# Patient Record
Sex: FEMALE | Race: WHITE
[De-identification: names, ages, dates, MRNs, and addresses within clinical notes are randomized per-mention and may not be internally consistent; named-entity substitution may affect disease eponyms.]

---

## 2018-02-01 ENCOUNTER — HOSPITAL ENCOUNTER (EMERGENCY)
Dept: HOSPITAL 31 - C.ER | Age: 68
Discharge: LEFT BEFORE DISCHARGE | End: 2018-02-01
Payer: COMMERCIAL

## 2018-02-01 VITALS
HEART RATE: 59 BPM | OXYGEN SATURATION: 96 % | TEMPERATURE: 98.5 F | DIASTOLIC BLOOD PRESSURE: 82 MMHG | RESPIRATION RATE: 14 BRPM | SYSTOLIC BLOOD PRESSURE: 147 MMHG

## 2018-02-01 VITALS — BODY MASS INDEX: 28.3 KG/M2

## 2018-02-01 DIAGNOSIS — Z02.89: Primary | ICD-10-CM

## 2018-02-01 DIAGNOSIS — F11.10: ICD-10-CM

## 2018-02-03 ENCOUNTER — HOSPITAL ENCOUNTER (EMERGENCY)
Dept: HOSPITAL 31 - C.ER | Age: 68
Discharge: HOME | End: 2018-02-03
Payer: COMMERCIAL

## 2018-02-03 VITALS
SYSTOLIC BLOOD PRESSURE: 135 MMHG | HEART RATE: 59 BPM | TEMPERATURE: 98.3 F | DIASTOLIC BLOOD PRESSURE: 71 MMHG | OXYGEN SATURATION: 99 % | RESPIRATION RATE: 18 BRPM

## 2018-02-03 VITALS — BODY MASS INDEX: 28.3 KG/M2

## 2018-02-03 DIAGNOSIS — F19.10: Primary | ICD-10-CM

## 2018-02-03 NOTE — C.PDOC
History Of Present Illness


68 year old female presents to the emergency room requesting detox. Patient has 

history of substance abuse. She offers no physical complaints at this time. 

Denies having suicidal or homicidal ideations. 





PMD: None





Time Seen by Provider: 02/03/18 13:24


Chief Complaint (Nursing): Substance Abuse


History Per: Patient


History/Exam Limitations: no limitations





Past Medical History


Reviewed: Historical Data, Nursing Documentation, Vital Signs


Vital Signs: 


 Last Vital Signs











Temp  98.3 F   02/03/18 12:55


 


Pulse  59 L  02/03/18 12:55


 


Resp  18   02/03/18 12:55


 


BP  135/71   02/03/18 12:55


 


Pulse Ox  99   02/03/18 13:58














- Medical History


PMH: Anxiety, Bipolar Disorder, Diverticulitis, Gastritis, HTN


   Denies: Depression, Diabetes, Hepatitis, HIV, Chronic Kidney Disease, 

Seizures, Sexually Transmitted Disease


Surgical History: Cholecystectomy





- [facility] Procedures








INJECT/INFUSE ELECTROLYT (06/11/15)


INJECT/INFUSE NEC (06/11/15)








Family History: States: No Known Family Hx





- Social History


Hx Tobacco Use: Yes (3 sticks/day)


Hx Alcohol Use: No


Hx Substance Use: Yes





- Immunization History


Hx Tetanus Toxoid Vaccination: No


Hx Influenza Vaccination: Yes


Hx Pneumococcal Vaccination: No





Review Of Systems


Except As Marked, All Systems Reviewed And Found Negative.


Constitutional: Negative for: Fever, Chills


Psych: Negative for: Suicidal ideation (and homicidal)





Physical Exam





- Physical Exam


Appears: Well, Non-toxic, No Acute Distress


Skin: Normal Color, Warm, Dry


Head: Atraumatic, Normacephalic


Eye(s): bilateral: Normal Inspection


Oral Mucosa: Moist


Neck: Normal ROM


Chest: Symmetrical


Respiratory: No Accessory Muscle Use


Extremity: Normal ROM, No Deformity


Neurological/Psych: Oriented x3, Normal Speech





ED Course And Treatment


O2 Sat by Pulse Oximetry: 99 (RA)


Pulse Ox Interpretation: Normal





Medical Decision Making


Medical Decision Making: 





Time: 13:17


Plan:


No detox beds are available today.


Patient seen by crisis worker and provided with outpatient resources. 

Instructed to return to ER for any worsening symptoms. 





Disposition


Counseled Patient/Family Regarding: Diagnosis, Need For Followup





- Disposition


Referrals: 


Alcoholics Anonymous [Outside]


[facility] [Outside]


[facility] [Outside]


Disposition: HOME/ ROUTINE


Disposition Time: 14:01


Condition: STABLE


Additional Instructions: 


Follow up with outpatient services


Return to ED if any increase symptoms


Instructions:  Narcotic Abuse (ED)


Forms:  [facility] (English)





- POA


Present On Arrival: None





- Clinical Impression


Clinical Impression: 


 Substance abuse








- PA / NP / Resident Statement


MD/DO has reviewed & agrees with the documentation as recorded.





- Scribe Statement


The provider has reviewed the documentation as recorded by the Scribe (Lawanda Soni)





All medical record entries made by the Scribe were at my direction and 

personally dictated by me. I have reviewed the chart and agree that the record 

accurately reflects my personal performance of the history, physical exam, 

medical decision making, and the department course for this patient. I have 

also personally directed, reviewed, and agree with the discharge instructions 

and disposition.

## 2018-02-13 ENCOUNTER — HOSPITAL ENCOUNTER (INPATIENT)
Dept: HOSPITAL 31 - C.ER | Age: 68
LOS: 5 days | Discharge: HOME | DRG: 745 | End: 2018-02-18
Attending: PSYCHIATRIC UNIT | Admitting: PSYCHIATRIC UNIT
Payer: COMMERCIAL

## 2018-02-13 VITALS — BODY MASS INDEX: 28.3 KG/M2

## 2018-02-13 DIAGNOSIS — I34.1: ICD-10-CM

## 2018-02-13 DIAGNOSIS — F41.1: ICD-10-CM

## 2018-02-13 DIAGNOSIS — F41.0: ICD-10-CM

## 2018-02-13 DIAGNOSIS — I10: ICD-10-CM

## 2018-02-13 DIAGNOSIS — F91.9: ICD-10-CM

## 2018-02-13 DIAGNOSIS — F11.23: Primary | ICD-10-CM

## 2018-02-13 DIAGNOSIS — F17.210: ICD-10-CM

## 2018-02-13 DIAGNOSIS — Z90.710: ICD-10-CM

## 2018-02-13 DIAGNOSIS — F31.9: ICD-10-CM

## 2018-02-13 LAB
ALBUMIN SERPL-MCNC: 4.4 G/DL (ref 3.5–5)
ALBUMIN/GLOB SERPL: 0.9 {RATIO} (ref 1–2.1)
ALT SERPL-CCNC: 32 U/L (ref 9–52)
AST SERPL-CCNC: 28 U/L (ref 14–36)
BACTERIA #/AREA URNS HPF: (no result) /[HPF]
BASOPHILS # BLD AUTO: 0.1 K/UL (ref 0–0.2)
BASOPHILS NFR BLD: 0.7 % (ref 0–2)
BILIRUB UR-MCNC: (no result) MG/DL
BUN SERPL-MCNC: 16 MG/DL (ref 7–17)
CALCIUM SERPL-MCNC: 10 MG/DL (ref 8.6–10.4)
CAOX CRY #/AREA URNS HPF: (no result) /HPF
EOSINOPHIL # BLD AUTO: 0.2 K/UL (ref 0–0.7)
EOSINOPHIL NFR BLD: 2.4 % (ref 0–4)
ERYTHROCYTE [DISTWIDTH] IN BLOOD BY AUTOMATED COUNT: 13.8 % (ref 11.5–14.5)
GFR NON-AFRICAN AMERICAN: 50
GLUCOSE UR STRIP-MCNC: NORMAL MG/DL
HGB BLD-MCNC: 15.6 G/DL (ref 11–16)
HYALINE CASTS #/AREA URNS LPF: (no result) /LPF (ref 0–2)
LEUKOCYTE ESTERASE UR-ACNC: (no result) LEU/UL
LYMPHOCYTES # BLD AUTO: 2.1 K/UL (ref 1–4.3)
LYMPHOCYTES NFR BLD AUTO: 23.8 % (ref 20–40)
MCH RBC QN AUTO: 31 PG (ref 27–31)
MCHC RBC AUTO-ENTMCNC: 33.6 G/DL (ref 33–37)
MCV RBC AUTO: 92.2 FL (ref 81–99)
MONOCYTES # BLD: 0.5 K/UL (ref 0–0.8)
MONOCYTES NFR BLD: 5.2 % (ref 0–10)
NEUTROPHILS # BLD: 5.9 K/UL (ref 1.8–7)
NEUTROPHILS NFR BLD AUTO: 67.9 % (ref 50–75)
NRBC BLD AUTO-RTO: 0.1 % (ref 0–2)
PH UR STRIP: 5 [PH] (ref 5–8)
PLATELET # BLD: 262 K/UL (ref 130–400)
PMV BLD AUTO: 8.8 FL (ref 7.2–11.7)
PROT UR STRIP-MCNC: (no result) MG/DL
RBC # BLD AUTO: 5.05 MIL/UL (ref 3.8–5.2)
RBC # UR STRIP: NEGATIVE /UL
SP GR UR STRIP: 1.03 (ref 1–1.03)
SQUAMOUS EPITHIAL: 5 /HPF (ref 0–5)
URINE NITRATE: NEGATIVE
UROBILINOGEN UR-MCNC: 2 MG/DL (ref 0.2–1)
WBC # BLD AUTO: 8.8 K/UL (ref 4.8–10.8)

## 2018-02-13 PROCEDURE — HZ46ZZZ GROUP COUNSELING FOR SUBSTANCE ABUSE TREATMENT, PSYCHOEDUCATION: ICD-10-PCS | Performed by: PSYCHIATRIC UNIT

## 2018-02-13 PROCEDURE — HZ2ZZZZ DETOXIFICATION SERVICES FOR SUBSTANCE ABUSE TREATMENT: ICD-10-PCS | Performed by: PSYCHIATRIC UNIT

## 2018-02-13 PROCEDURE — HZ59ZZZ INDIVIDUAL PSYCHOTHERAPY FOR SUBSTANCE ABUSE TREATMENT, SUPPORTIVE: ICD-10-PCS | Performed by: PSYCHIATRIC UNIT

## 2018-02-13 PROCEDURE — GZ3ZZZZ MEDICATION MANAGEMENT: ICD-10-PCS | Performed by: PSYCHIATRIC UNIT

## 2018-02-13 NOTE — C.PDOC
Addendum entered and electronically signed by Miryam Lemons DO  02/13/18 18:57

: 








- PA / NP / Resident Statement


MD/ has examined the patient and agrees with the treatment plan.





Original Note:








History Of Present Illness





<Alesha Avila - Last Filed: 02/13/18 18:23>





<Miryam Lemons - Last Filed: 02/13/18 18:56>





CC: "I'm here for detox"





HPI: 68 year old female with past medical history of heroin abuse, muscular 

atrophy, HTN, sigmoid diverticulosis, ANGE, depression and panic attacks 

presents to the ED for detox.  Patient states she has tried to stop heroin 

before on her own and at a hospital but the withdrawal symptoms were very 

extreme she could not.  She states she has never entered into a detox program.  

She denies nausea, vomiting, diarrhea, constipation, fever, headache, chest pain

, palpitations or shortness of breath.





PMD: Dr. Rowena Coburn


Medical History: muscular atrophy (patient uses a wheel chair for to walk long 

distances), HTN, sigmoid diverticulosis, ANGE, depression and panic attacks, 

gastritis, mitral valve prolapse 


Surgical history: umbilical hernia repair; c-sections x2; bladder lift x2; 

hysterectomy; left ovarian cyst removal; rectal prolapse repair; cholecystectomy

; bowel pacemaker 


Medications: Patient does not know complete list of medications: Meclizine 25mg 

po daily; Clozapine; Metroprolol tartrate 25mg po daily; sometimes takes 

Valsartan 160mg po 


Allergies: NKDA


Social History: lives with ; 10 bags of heroin IV per day for 20 years; 

denies alcohol; smoking for 17 years 4 cigarettes per day  (Alesha Avila)


History Per: Patient


History/Exam Limitations: no limitations


Modifying Factor(s): None





<Alesha Avila - Last Filed: 02/13/18 18:23>





<Miryam Lemons - Last Filed: 02/13/18 18:56>


Time Seen by Provider: 02/13/18 17:37


Chief Complaint (Nursing): Substance Abuse





Past Medical History





- Medical History


PMH: Anxiety, Bipolar Disorder, Depression, Diverticulitis, Gastritis, HTN


   Denies: Diabetes, Hepatitis, HIV, Chronic Kidney Disease, Seizures, Sexually 

Transmitted Disease


Surgical History: Cholecystectomy (partial)





- Social History


Hx Tobacco Use: Yes (3 sticks/day)


Hx Alcohol Use: No


Hx Substance Use: Yes





- Immunization History


Hx Tetanus Toxoid Vaccination: No


Hx Influenza Vaccination: Yes


Hx Pneumococcal Vaccination: No





<Alesha Avila - Last Filed: 02/13/18 18:23>





- Medical History


PMH: Anxiety, Bipolar Disorder, Depression, Diverticulitis, Gastritis, HTN


   Denies: Diabetes, Hepatitis, HIV, Chronic Kidney Disease, Seizures, Sexually 

Transmitted Disease


Surgical History: Cholecystectomy


Family History: States: No Known Family Hx





- Social History


Hx Tobacco Use: Yes


Hx Alcohol Use: No


Hx Substance Use: Yes





- Immunization History


Hx Tetanus Toxoid Vaccination: No


Hx Influenza Vaccination: Yes


Hx Pneumococcal Vaccination: No





<Miryam Lemons - Last Filed: 02/13/18 18:56>


Vital Signs: 





 Last Vital Signs











Temp  98.3 F   02/13/18 17:27


 


Pulse  85   02/13/18 17:27


 


Resp  18   02/13/18 17:27


 


BP  134/69   02/13/18 17:27


 


Pulse Ox  92 L  02/13/18 18:23














- [facility] Procedures











INJECT/INFUSE ELECTROLYT (06/11/15)


INJECT/INFUSE NEC (06/11/15)











Review Of Systems


Constitutional: Negative for: Fever, Chills, Sweats, Weakness


Cardiovascular: Negative for: Chest Pain, Palpitations, Edema


Respiratory: Negative for: Cough, Shortness of Breath


Gastrointestinal: Negative for: Nausea, Vomiting, Abdominal Pain, Diarrhea, 

Constipation


Genitourinary: Negative for: Dysuria


Psych: Positive for: Anxiety





<Alesha Avila - Last Filed: 02/13/18 18:23>





Physical Exam





- Physical Exam


Appears: Well


Skin: Normal Color


Head: Atraumatic


Eye(s): bilateral: Normal Inspection


Oral Mucosa: Moist


Cardiovascular: Rhythm Regular


Respiratory: Normal Breath Sounds, No Decreased Breath Sounds, No Accessory 

Muscle Use, No Rales, No Rhonchi, No Stridor, No Wheezing


Gastrointestinal/Abdominal: Normal Exam, Bowel Sounds, Soft, No Tenderness


Back: Other (pacemaker device on left lower back )


Extremity: No Tenderness, No Pedal Edema, No Calf Tenderness


Neurological/Psych: Oriented x3, Normal Speech, Normal Cognition, Normal 

Cranial Nerves





<Alesha Avila - Last Filed: 02/13/18 18:23>





ED Course And Treatment


O2 Sat by Pulse Oximetry: 92





<Alesha Avila - Last Filed: 02/13/18 18:23>





Medical Decision Making





<Alesha Avila - Last Filed: 02/13/18 18:23>





<Miryam Lemons - Last Filed: 02/13/18 18:56>


Medical Decision Making: 





Heroin Detox


- f/u alcohol serum


- f/u UDS 


- f/u CMP, CBC, UA (Alesha Avila)





Disposition





<Alesha Avila - Last Filed: 02/13/18 18:23>





- Disposition


Disposition Time: 19:00





<Miryam Lemons - Last Filed: 02/13/18 18:56>





- Disposition


Condition: STABLE


Forms:  [facility] (English)





- Clinical Impression


Clinical Impression: 


 Heroin dependence








Physician Patient Turnover


Patient Signed Over To: Sheri Myrick


Handoff Comments: pending blood work, UA, UDS results, crisis evaluation, 

disposition





<Miryam Lemons - Last Filed: 02/13/18 18:56>

## 2018-02-13 NOTE — PCM.BM
<Ania Cox - Last Filed: 02/13/18 21:08>





Treatment Plan Problems





- Problems identified on initial assessmt


  ** Potential for opiate withdrawal


Date Initiated: 02/13/18


Time Initiated: 21:08


Assessment reference: NA


Status: Active





Treatment assets and liabiliti


Patient Assests: negotiates basic needs, cognitively intact


Patient Liabilities: substance abuse (opiates), medical problems (Muscular 

atrophy, HTN,)





- Milieu Protocol


Maintain good personal hygiene: daily Encourage regular showers, daily Remind 

patient to perform daily oral care, daily Assist patient to perform ADL's


Conduct patient checks and document Observation sheet: Q15 minutes


Maintain personal safety: every shift Educate patient to report safety concerns 

to staff, every shift Monitor environment for contraband/sharps


Medication safety: Monitor for expected outcome, potential side effects: every 

shift, Assess barriers to learning: every shift, Assess readiness for 

medication education: every shift





<Muriel Arias - Last Filed: 02/15/18 00:38>





- Diagnosis


(1) Opioid use disorder, severe, dependence


Status: Acute   


Interventions: 





02/15/18 00:39


* Assess 7x/week regarding severity of withdrawal


* Educate regarding risks, benefits, side effects and alternatives of 

medications


* Use Motivational Interviewing for abstinence


* Use CBT for relapse prevention


* Medication management for withdrawal symptoms


* Encourage medication assisted treatment


* 








<Karena Falcon - Last Filed: 02/15/18 11:24>





Family Contact


Family contact: Patient agrees to contact, Telephone contact initiated by staff


Family contact name: Mateo


Family contacted how many times per week?: 3





- Goals for Treatment


Patient goals for treatment: Complete detox and attend IOP.





Discharge/Continuing Care





- Education Needs


Education Needs: Patient Medication, Patient Diagnosis/Disease Process, Patient 

Coping Skills, Patient Anger Management skills, Patient Placement options, 

Patient Community resources (spouse is in early recovery also.), Significant 

Other Medication, Significant Other Diagnosis/Disease Process, Significant 

Other Coping Skills, Significant Other Anger Management skills, Significant 

Other Placement options, Significant Other Community resources





- Discharge


Discharge Criteria: No longer exhibiting s/s of withdrawal, Reduction of target 

symptoms


Discharge to:: Home, With Family





- Treatment Team Participation


Patient/Family/SO Statement: 





02/15/18 11:24


"I wanna go to IOP not inpatient."


Discussed with Family/SO: Yes


Was Patient/Family/SO present at Treatment Team Meeting: Yes

## 2018-02-14 NOTE — PCM.PSYCH
Initial Psychiatric Evaluation





- Initial Psychiatric Evaluation


Type of Admission: Voluntary


Legal Status: Capacity


Chief Complaint (in patient's own words): 


" I want to detox."








History of Present Illness and Precipitating Events: 


The patient is a 68 year old  female who lives with her boyfriend of 35 

years. She has 4 adult children and is currently unemployed. The patient 

reports that she shoots 10 bags of heroin a day. She denies sharing needle and 

reports last using yesterday around 2pm.  She began using 25 years ago after 

she had a surgery and was in so much pain, my neighbor came over and gave me 

something and told me it would take the pain away but I didnt know what it was. 

The patient denies using other drugs and alcohol. She reports that she smokes 2-

3 cigarettes a day. The patient reports that he boyfriend is also a user but 

was here in detox last week. The patient reports that she has been to a 

methadone maintenance program 3-4 years ago for 2 months, her dose was 60mg. 

The pt reports yawning, eye tearing, chills, and belly cramping. 





Detox Hx: denies


Rehab Hx:denies


Medical Hx:HTH, muscular atrophy, diverticular disease 


Medications: metoprolol, Mirtazapine, clonazepam 


Psych Hx: depression, anxiety


Fam psych Hx: denies


Family substance abuse Hx: denies








Current Medications: 





Active Medications











Generic Name Dose Route Start Last Admin





  Trade Name Freq  PRN Reason Stop Dose Admin


 


Al Hydrox/Mg Hydrox/Simethicone  30 ml  02/14/18 10:24  





  Maalox 30 Ml  PO   





  TID PRN   





  Indigestion / Heartburn   


 


Clonazepam  0.5 mg  02/14/18 14:00  





  Klonopin  PO  02/15/18 10:01  





  TID DAVIS   


 


Clonazepam  0.5 mg  02/15/18 18:00  





  Klonopin  PO  02/16/18 10:01  





  BID DAVIS   


 


Clonidine HCl  0.1 mg  02/14/18 10:24  





  Catapres  PO   





  Q8 PRN   





  COWS Score More or Equal to 5   


 


Fluoxetine HCl  10 mg  02/15/18 10:00  





  Prozac  PO   





  DAILY DAVIS   


 


Gabapentin  100 mg  02/14/18 14:00  





  Neurontin  PO   





  TID DAVIS   


 


Gemfibrozil  600 mg  02/14/18 18:00  





  Lopid  PO   





  BID DAVIS   


 


Hydroxyzine HCl  25 mg  02/13/18 22:10  02/14/18 06:47





  Atarax  PO   25 mg





  Q6 PRN   Administration





  Anxiety   


 


Loperamide HCl  2 mg  02/14/18 10:24  





  Imodium  PO   





  Q8 PRN   





  Diarrhea   


 


Meclizine HCl  25 mg  02/14/18 18:00  





  Antivert  PO   





  BID DAVIS   


 


Mirtazapine  15 mg  02/14/18 22:00  





  Remeron  PO   





  HS DAVIS   


 


Ondansetron HCl  4 mg  02/14/18 10:24  





  Zofran Tab  PO   





  Q8 PRN   





  Nausea/Vomiting   


 


Trazodone HCl  50 mg  02/13/18 22:12  02/13/18 22:18





  Desyrel  PO   50 mg





  HS PRN   Administration





  insomnia   














Past Psychiatric History





- Past Psychiatric History


Pertinent Medical Hx (Current Medical&Sleep Prob, Allergies): 





 Allergies











Allergy/AdvReac Type Severity Reaction Status Date / Time


 


No Known Allergies Allergy   Verified 02/03/18 12:57








 





Unobtainable  02/13/18 











Review of Systems





- Constitutional


Constitutional: Chills, Weakness





- EENT


Eyes: Other (excessive tearing)





- Gastrointestinal


Gastrointestinal: Abdominal Pain, Cramping





- Neurological


Neurological: Weakness


Additional comments: 





yawning








- Psychiatric


Psychiatric: Anxiety, Depression





Mental Status Examination





- Personal Presentation


Personal Presentation: Looks stated age





- Affect


Affect: Broad





- Motor Activity


Motor Activity: Calm





- Reliability in Providing Information


Reliability in Providing Information: Good





- Speech


Speech: Organized





- Mood


Mood: Neutral





- Formal Thought Process


Formal Thought Process: No Impairment





- Obsessions/Compulsions


Obsessions: None


Compulsions: None





- Cognitive Functions


Orientation: Person, Place, Situation, Time


Sensorium: Alert


Attention/Concentration: Attentive


Abstract Thinking: [place]


Estimate of Intelligence: Average


Judgement: Intact, as evidence by: Good judgement, Intact, as evidence by: 

Insight regarding need for hospitalization


Memory: Recent intact, as evidence by: Ability to recall events of the day, 

Remote intact, as evidenced by: Abilit to recall sig. life events





- Risk


Risk: Withdrawal





- Strength & Assets Inventory


Strength & Assets Inventory: Cooperative





- Limitations


Additional comments: 


boyfriend is a user 








DSM 5 DX





- DSM 5


DSM 5 Diagnosis: 


Opioid Use Disorder - severe


Opioid Withdrawal 


Anxiety Disorder


Depression 








- Recommended/Plan of Treatment


Treatment Recommendations and Plan of Treatment: 





Opioid detox


Gabapentin for augmentation


As needed medications


All risks, benefits and alternatives of the meds discussed,


 and the pt agreed and understood. 


Attend groups and activities


Supportive therapy and psychoeducation


MI for abstinence


CBT for relapse prevention


Encourage MAT


Refer to rehab or IOP, and self-help groups





35 min





Projected ELOS: 4-5


Prognosis: good with treatment


Discharge Plan and Discharge Criteria: 





Outpatient Rehab and MAT

## 2018-02-15 NOTE — PCM.PYCHPN
Psychiatric Progress Note





- Psychiatric Progress Note


Patient seen today, length of contact: 15 min


Patient Chief Complaint: 


" I am anxious"


Problems Identified/Issues Discussed: 





The pt is seen, chart reviewed, case discussed with staff.


The pt is compliant with medications. Symptoms improving and patient needs more 

time to stabilize. After care discussed, support and psychoeducation given.


The patient reports that she feels anxious. She reports waking up in the middle 

of the night feeling anxious and reports withdrawal symptoms of: body aches, 

belly cramping, and decreased appetite. 





Medication Change: Yes


Medical Record Reviewed: Yes





Mental Status Examination





- Cognitive Function


Orientation: Person, Place, Situation, Time


Memory: Intact


Attention: WNL


Concentration: WNL


Association: WNL


Fund of Knowledge: [facility]


Decription of patient's judgement and insights: 





good








- Mood


Mood: Anxious





- Affect


Affect: Broad





- Speech


Speech: Appropriate





- Language


Language: Word Retrieval





- Formal Thought Process


Formal Thought Process: No Impairment





- Suicidal Ideation


Suicidal Ideation: No





- Homicidal Ideation


Homicidal Ideation: No





Goal/Treatment Plan





- Goal/Treatment Plan


Need for Continued Stay: Remain at risks for inpatient hospitalization, 

Discharge may exacerbated symptoms


Progress Toward Problem(s) and Goals/Treatment Plan: 





Opioid detox


Gabapentin for augmentation


As needed medications


All risks, benefits and alternatives of the meds discussed,


 and the pt agreed and understood. 


Attend groups and activities


Supportive therapy and psychoeducation


MI for abstinence


CBT for relapse prevention


Encourage MAT


Refer to rehab or IOP, and self-help groups











Estimated Date of D/C: 02/18/18

## 2018-02-16 VITALS — RESPIRATION RATE: 18 BRPM

## 2018-02-16 RX ADMIN — PANTOPRAZOLE SODIUM SCH MG: 20 TABLET, DELAYED RELEASE ORAL at 12:29

## 2018-02-16 NOTE — PCM.PYCHPN
Psychiatric Progress Note





- Psychiatric Progress Note


Patient seen today, length of contact: 15 min


Patient Chief Complaint: 


" I am having anxiety"


Problems Identified/Issues Discussed: 





The pt is seen, chart reviewed, case discussed with staff.


The pt is compliant with medications. Symptoms improving and patient needs more 

time to stabilize. After care discussed, support and psychoeducation given.


The patient reports that she is doing okay but she still feels anxious. She 

reports waking up at 3 am this morning due to anxiety. She says that she has a 

history of panic attacks. She also reports feeling of cramping after eating. 








Medication Change: Yes


Medical Record Reviewed: Yes





Mental Status Examination





- Cognitive Function


Orientation: Person, Place, Situation, Time


Memory: Intact


Attention: WNL


Concentration: WNL


Association: WNL


Fund of Knowledge: WNL


Decription of patient's judgement and insights: 





good








- Mood


Mood: Anxious





- Affect


Affect: Broad





- Speech


Speech: Appropriate





- Language


Language: Word Retrieval





- Formal Thought Process


Formal Thought Process: No Impairment





- Suicidal Ideation


Suicidal Ideation: No





- Homicidal Ideation


Homicidal Ideation: No





Goal/Treatment Plan





- Goal/Treatment Plan


Need for Continued Stay: Remain at risks for inpatient hospitalization, 

Discharge may exacerbated symptoms


Progress Toward Problem(s) and Goals/Treatment Plan: 





Opioid detox


Gabapentin for augmentation


As needed medications


All risks, benefits and alternatives of the meds discussed,


 and the pt agreed and understood. 


Attend groups and activities


Supportive therapy and psychoeducation


MI for abstinence


CBT for relapse prevention


Encourage MAT


Refer to rehab or IOP, and self-help groups











Estimated Date of D/C: 02/18/18





- Smoking Cessation


Smoking Cessation Initiated: No


Reason for not providing: patient does not smoke

## 2018-02-17 RX ADMIN — PANTOPRAZOLE SODIUM SCH MG: 20 TABLET, DELAYED RELEASE ORAL at 10:27

## 2018-02-17 NOTE — PCM.PYCHPN
Psychiatric Progress Note





- Psychiatric Progress Note


Patient seen today, length of contact: 15 min


Patient Chief Complaint: 





"My withdrawal symptoms are getting better with the medication"


Problems Identified/Issues Discussed: 





The pt is seen, chart reviewed, case discussed with staff. The pt stated that 

her withdrawal symptoms are getting better after taking medication. The pt is 

compliant with medications and reports no side-effects. Symptoms are improving 

but needs more time to stabilize.


After care discussed, support and psychoeducation given


Medication Change: Yes


Medical Record Reviewed: Yes





Mental Status Examination





- Cognitive Function


Orientation: Person, Place, Situation, Time


Memory: Intact


Attention: WNL


Concentration: WNL


Association: WN


Fund of Knowledge: [facility]


Decription of patient's judgement and insights: 





good/good


She is calm and cooperative





- Mood


Mood: Anxious





- Affect


Affect: Broad





- Speech


Speech: Appropriate





- Language


Language: Word Retrieval





- Formal Thought Process


Formal Thought Process: No Impairment


Psychotic Thoughts and Behaviors: 





denied





- Suicidal Ideation


Suicidal Ideation: No


Plan: 





denied





- Homicidal Ideation


Homicidal Ideation: No


Plan: 





denied





Goal/Treatment Plan





- Goal/Treatment Plan


Need for Continued Stay: Remain at risks for inpatient hospitalization, 

Discharge may exacerbated symptoms


Progress Toward Problem(s) and Goals/Treatment Plan: 





continue current treatment.


Therapy in milieu.





Estimated Date of D/C: 02/18/18





- Smoking Cessation


Smoking Cessation Initiated: Yes

## 2018-02-18 VITALS — OXYGEN SATURATION: 97 %

## 2018-02-18 VITALS — SYSTOLIC BLOOD PRESSURE: 154 MMHG | DIASTOLIC BLOOD PRESSURE: 90 MMHG | HEART RATE: 90 BPM | TEMPERATURE: 98.1 F

## 2018-02-18 RX ADMIN — PANTOPRAZOLE SODIUM SCH MG: 20 TABLET, DELAYED RELEASE ORAL at 09:06

## 2018-02-18 NOTE — PCM.PYCHDC
Mental Status Examination





- Mental Status Examination


Orientation: Person, Place, Situation, Time


Memory: Intact


Mood: Neutral


Affect: Broad


Speech: Appropriate


Attention: WNL


Concentration: WNL


Association: WNL


Fund of Knowledge: WNL


Formal Thought Process: No Impairment


Description of patient's judgement and insight: 





good/good


She is calm and cooperative and smiling appropriately


Psychotic Thoughts and Behaviors: 





denied


Suicidal Ideation: No


Current Homicidal Ideation?: No


Plan: 





denied SI, HI, intent or plan





Discharge Summary





- Discharge Note


Reason for Hospitalization: 





The patient is a 68 year old  female who lives with her boyfriend of 35 

years. She has 4 adult children and is currently unemployed. The patient 

reports that she shoots 10 bags of heroin a day. She denies sharing needle and 

reports last using yesterday around 2pm.  She began using 25 years ago after 

she had a surgery and was in so much pain, my neighbor came over and gave me 

something and told me it would take the pain away but I didnt know what it was. 

The patient denies using other drugs and alcohol. She reports that she smokes 2-

3 cigarettes a day. The patient reports that he boyfriend is also a user but 

was here in detox last week. The patient reports that she has been to a 

methadone maintenance program 3-4 years ago for 2 months, her dose was 60mg. 

The pt reports yawning, eye tearing, chills, and belly cramping. 





Detox Hx: denies


Rehab Hx:denies


Medical Hx:HTH, muscular atrophy, diverticular disease 


Medications: metoprolol, Mirtazapine, clonazepam 


Psych Hx: depression, anxiety


Fam psych Hx: denies


Family substance abuse Hx: denies


Consultations:: List each consultation separately and include:  1. Reason for 

request.  2. Findings.  3. Follow-up


Summary of Hospital Course include:: 1. Description of specific treatment plan 

utilized for patients during their course of treatmen.  2. Summarize the time-

course for resolution of acute symptoms and/or regressed behaviors.  3. 

Describe issues identified and worked on during hospitalization.  4. Describe 

medication utilized.  5. Describe medical problems identified and treated.  6. 

Reassessment of suicide risk


Summary of Hospital Course: 





The pt was admitted and started on detox treatment with psychotherapy, support, 

psychoeducation and medications. MI and CBT techniques were used. The pt was 

compliant with the treatment. The pt attended groups and activities, as well as 

milieu therapy. All the risks and benefits of medications were discussed and 

the patient understood and agreed. The pt improved with the treatment. Pt 

appreciate the treatment and care which was provided to him. At the time of d/c 

pt denied any depresive symptoms, manic symptoms. He denied any SI, HI, intent 

or plan. Pt denied any perceptual disturbances. He had behavioral issues. 

Psychoeducation was provided to the pt to be compliant with the medication, 

treatment plan and f/u plan after the discharge.


After care discussed with the patient.





- Diagnosis


(1) Opioid withdrawal


Status: Resolved   





(2) Opioid use disorder, severe, dependence


Status: Resolved   





- Final Diagnosis (DSM 5)


Condition upon Discharge: STABLE


Disposition: HOME/ ROUTINE


Follow-up Treatment Plan: 


Continue below medications after discharge.


Follow after care plan as discussed.


Use relapse prevention copying skills


Return to ER or call 911 if suicidal, homicidal or symptoms relapse.


Stay away from stress, alcohol and drugs.


See primary doctor once a year.


Time spend 28 minutes


Prescriptions/Medication Reconciliation: 


Escitalopram [Lexapro] 5 mg PO DAILY 30 Days #30 tab


Gabapentin [Neurontin] 300 mg PO BID 60 Days #60 cap


Meclizine [Meclizine*] 25 mg PO BID #30 tab


Mirtazapine [Remeron] 30 mg PO HS 30 Days #30 tab


traZODone [Desyrel] 50 mg PO HS PRN 30 Days #15 tab


 PRN Reason: insomnia





- Smoking Cessation


Smoking Cessation Medication prescribed: Yes





- Antipsychotic Medications


Pt discharged on 2 or more routine antipsychotic medications: No

## 2018-03-25 ENCOUNTER — HOSPITAL ENCOUNTER (EMERGENCY)
Dept: HOSPITAL 31 - C.ER | Age: 68
Discharge: HOME | End: 2018-03-25
Payer: MEDICAID

## 2018-03-25 VITALS — BODY MASS INDEX: 28.3 KG/M2

## 2018-03-25 VITALS
HEART RATE: 59 BPM | DIASTOLIC BLOOD PRESSURE: 54 MMHG | RESPIRATION RATE: 17 BRPM | TEMPERATURE: 98.1 F | SYSTOLIC BLOOD PRESSURE: 111 MMHG | OXYGEN SATURATION: 96 %

## 2018-03-25 DIAGNOSIS — I10: ICD-10-CM

## 2018-03-25 DIAGNOSIS — F19.10: Primary | ICD-10-CM

## 2018-03-25 DIAGNOSIS — F17.210: ICD-10-CM

## 2018-03-25 LAB
ALBUMIN SERPL-MCNC: 4.2 G/DL (ref 3.5–5)
ALBUMIN/GLOB SERPL: 1 {RATIO} (ref 1–2.1)
ALT SERPL-CCNC: 14 U/L (ref 9–52)
AST SERPL-CCNC: 20 U/L (ref 14–36)
BASOPHILS # BLD AUTO: 0 K/UL (ref 0–0.2)
BASOPHILS NFR BLD: 0.7 % (ref 0–2)
BUN SERPL-MCNC: 14 MG/DL (ref 7–17)
CALCIUM SERPL-MCNC: 9.3 MG/DL (ref 8.6–10.4)
EOSINOPHIL # BLD AUTO: 0.2 K/UL (ref 0–0.7)
EOSINOPHIL NFR BLD: 2.5 % (ref 0–4)
ERYTHROCYTE [DISTWIDTH] IN BLOOD BY AUTOMATED COUNT: 14 % (ref 11.5–14.5)
GFR NON-AFRICAN AMERICAN: 55
HGB BLD-MCNC: 14.4 G/DL (ref 11–16)
LYMPHOCYTES # BLD AUTO: 1.9 K/UL (ref 1–4.3)
LYMPHOCYTES NFR BLD AUTO: 29.3 % (ref 20–40)
MCH RBC QN AUTO: 31.3 PG (ref 27–31)
MCHC RBC AUTO-ENTMCNC: 34.1 G/DL (ref 33–37)
MCV RBC AUTO: 91.7 FL (ref 81–99)
MONOCYTES # BLD: 0.4 K/UL (ref 0–0.8)
MONOCYTES NFR BLD: 6.7 % (ref 0–10)
NEUTROPHILS # BLD: 4 K/UL (ref 1.8–7)
NEUTROPHILS NFR BLD AUTO: 60.8 % (ref 50–75)
NRBC BLD AUTO-RTO: 0.1 % (ref 0–2)
PLATELET # BLD: 227 K/UL (ref 130–400)
PMV BLD AUTO: 9 FL (ref 7.2–11.7)
RBC # BLD AUTO: 4.62 MIL/UL (ref 3.8–5.2)
WBC # BLD AUTO: 6.6 K/UL (ref 4.8–10.8)

## 2018-03-25 NOTE — CT
EXAM:

  CT Head Without Intravenous Contrast



CLINICAL HISTORY:

  68 years old, female; Pain; Headache



TECHNIQUE:

  Axial computed tomography images of the head/brain without intravenous 

contrast.  All CT scans at this facility use one or more dose reduction 

techniques, viz.: automated exposure control; ma/kV adjustment per patient size 

(including targeted exams where dose is matched to indication; i.e. head); or 

iterative reconstruction technique.

  Coronal and sagittal reformatted images were created and reviewed.



COMPARISON:

  No relevant prior studies available.



FINDINGS:

  Brain:  Extensive bilateral white matter hypoattenuation, nonspecific but may 

represent a chronic small vessel ischemic change, which would be advanced for 

age. Correlate with history. Vascular calcification. No hemorrhage.  No edema.

  Ventricles:  No hydrocephalus.

  Bones:  Skull is intact.

  Sinuses:  No acute sinusitis.

  Mastoid air cells:  No mastoid effusion.



IMPRESSION:     

  Extensive bilateral white matter hypoattenuation, nonspecific but may 

represent a chronic small vessel ischemic change, which would be advanced for 

age. Correlate with history. Correlate clinically.  Followup as warranted.

## 2018-03-25 NOTE — C.PDOC
History Of Present Illness


69 y/o female presents to the ED complaining of a headache with associated 

dizziness, onset 2 hours go. Family member also reports patient was confused an 

hour before, but this is now resolved. Denies any fever, chills, visual changes

, shortness of breath, nausea, vomiting, neck pain or stiffness. On further 

discussion patient reports Hx of headaches for years now, on and off.





PMD: Rowena Fowler 


Time Seen by Provider: 18 21:10


Chief Complaint (Nursing): Dizziness/Lightheaded


History Per: Patient


History/Exam Limitations: no limitations


Onset/Duration Of Symptoms: Hrs


Current Symptoms Are (Timing): Still Present





Past Medical History


Reviewed: Historical Data, Nursing Documentation, Vital Signs


Vital Signs: 


 Last Vital Signs











Temp  97.6 F   18 21:54


 


Pulse  65   18 21:54


 


Resp  16   18 21:54


 


BP  119/61   18 21:54


 


Pulse Ox  94 L  18 21:54














- Medical History


PMH: Anxiety, Bipolar Disorder, Depression, Diverticulitis, Gastritis, HTN


   Denies: Diabetes, Hepatitis, HIV, Chronic Kidney Disease, Seizures, Sexually 

Transmitted Disease


Surgical History: Cholecystectomy (partial)





- [facility] Procedures








DETOXIFICATION SERVICES FOR SUBSTANCE ABUSE TREATMENT (18)


GROUP  FOR SUBSTANCE ABUSE TREATMENT, PSYCHOEDUCATION (18)


INDIV PSYCHOTHERAPY FOR SUBSTANCE ABUSE TREATMENT, SUPPORT (18)


INJECT/INFUSE ELECTROLYT (06/11/15)


INJECT/INFUSE NEC (06/11/15)


MEDICATION MANAGEMENT (18)











- Social History


Hx Tobacco Use: Yes (3 sticks/day)


Hx Alcohol Use: No


Hx Substance Use: Yes





- Immunization History


Hx Tetanus Toxoid Vaccination: No


Hx Influenza Vaccination: Yes


Hx Pneumococcal Vaccination: No





Review Of Systems


Except As Marked, All Systems Reviewed And Found Negative.


Constitutional: Negative for: Fever, Chills


Eyes: Negative for: Vision Change


Respiratory: Negative for: Shortness of Breath


Gastrointestinal: Negative for: Nausea, Vomiting


Musculoskeletal: Negative for: Neck Pain


Neurological: Positive for: Confusion (now resolved), Headache, Dizziness.  

Negative for: Weakness, Numbness, Incoordination, Change in Speech





Physical Exam





- Physical Exam


Appears: Non-toxic, No Acute Distress, Other (appears alert, conscious)


Skin: Normal Color, Warm, Dry


Head: Atraumatic, Normacephalic


Eye(s): bilateral: Normal Inspection, PERRL, EOMI


Nose: Normal


Oral Mucosa: Moist


Neck: Normal ROM, No Midline Cervical Tenderness, Supple, No Other (meningeal 

signs)


Chest: Symmetrical


Cardiovascular: Rhythm Regular, No Murmur


Respiratory: Normal Breath Sounds, No Accessory Muscle Use, No Wheezing


Gastrointestinal/Abdominal: Soft, No Tenderness, No Distention


Extremity: Bilateral: Atraumatic, Normal Color And Temperature, Normal ROM


Neurological/Psych: Oriented x3, Normal Speech, Normal Cranial Nerves, Normal 

Motor, Normal Sensation, No Other (focal deficits)


Gait: Steady





ED Course And Treatment





- Laboratory Results


Result Diagrams: 


 18 21:53





 18 21:53


ECG: Interpreted By Me, Viewed By Me


ECG Rhythm: Sinus Bradycardia


ECG Interpretation: Normal, No Acute Changes


Interpretation Of ECG: Sinus bradycardia, normal tracings


Rate From EC





- CT Scan/US


  ** CT Head


Other Rad Studies (CT/US): Read By Radiologist, Radiology Report Reviewed


CT/US Interpretation: FINDINGS:  Brain: Extensive bilateral white matter 

hypoattenuation, nonspecific but may represent a chronic.  small vessel 

ischemic change, which would be advanced for age. Correlate with history. 

Vascular.  calcification. No hemorrhage. No edema.  Ventricles: No 

hydrocephalus.  Bones: Skull is intact.  Sinuses: No acute sinusitis.  Mastoid 

air cells: No mastoid effusion.  IMPRESSION:  Extensive bilateral white matter 

hypoattenuation, nonspecific but may represent a chronic small.  vessel 

ischemic change, which would be advanced for age. Correlate with history. 

Correlate clinically.  Followup as warranted.  Thank you for allowing us to 

participate in the care of your patient.  Dictated and Authenticated by: Nay Abbott MD.  2018 10:46 PM Eastern Time ([place] & [place])


Progress Note: Patient persisted to be alert and conscious, refused to be 

admitted for observation , Sttes she will see her doctor tomorrow.


Reevaluation Time: 23:00





Medical Decision Making


Medical Decision Making: 





Prior records reviewed: Patient with history of alcohol and opiate abuse, and 

recently received detox in February. 





Time: 21:16


Initial Plan:


--Basic blood work 


--Urine drug screen


--Chest x-ray


--EKG


--CT Head W/O contrast 








Disposition


Counseled Patient/Family Regarding: Diagnosis





- Disposition


Referrals: 


[facility] [Outside]


Disposition: HOME/ ROUTINE


Disposition Time: 22:56


Condition: STABLE


Instructions:  Polysubstance Abuse (DC)


Forms:  [facility] (English), Gen Discharge Inst Afghan


Print Language: Slovak





- POA


Present On Arrival: None





- Clinical Impression


Clinical Impression: 


 Substance abuse








- Scribe Statement


The provider has reviewed the documentation as recorded by the Scribe (Lawanda Soni)


Provider Attestation: 





All medical record entries made by the Scribe were at my direction and 

personally dictated by me. I have reviewed the chart and agree that the record 

accurately reflects my personal performance of the history, physical exam, 

medical decision making, and the department course for this patient. I have 

also personally directed, reviewed, and agree with the discharge instructions 

and disposition.

## 2018-03-26 NOTE — CARD
--------------- APPROVED REPORT --------------





EKG Measurement

Heart [id number]

CT [id number]

[id number]

[id number]

[id number]



<Conclusion>

Sinus bradycardia

Otherwise normal ECG